# Patient Record
Sex: MALE | Race: OTHER | HISPANIC OR LATINO | URBAN - METROPOLITAN AREA
[De-identification: names, ages, dates, MRNs, and addresses within clinical notes are randomized per-mention and may not be internally consistent; named-entity substitution may affect disease eponyms.]

---

## 2017-11-20 ENCOUNTER — OUTPATIENT (OUTPATIENT)
Dept: OUTPATIENT SERVICES | Facility: HOSPITAL | Age: 22
LOS: 1 days | End: 2017-11-20
Payer: MEDICAID

## 2017-11-20 ENCOUNTER — APPOINTMENT (OUTPATIENT)
Dept: INTERNAL MEDICINE | Facility: CLINIC | Age: 22
End: 2017-11-20
Payer: MEDICAID

## 2017-11-20 VITALS
SYSTOLIC BLOOD PRESSURE: 125 MMHG | HEIGHT: 66 IN | BODY MASS INDEX: 22.18 KG/M2 | HEART RATE: 87 BPM | WEIGHT: 138 LBS | OXYGEN SATURATION: 98 % | DIASTOLIC BLOOD PRESSURE: 79 MMHG

## 2017-11-20 PROCEDURE — G0463: CPT

## 2017-11-20 PROCEDURE — 86695 HERPES SIMPLEX TYPE 1 TEST: CPT

## 2017-11-20 PROCEDURE — 86696 HERPES SIMPLEX TYPE 2 TEST: CPT

## 2017-11-20 PROCEDURE — 86780 TREPONEMA PALLIDUM: CPT

## 2017-11-20 PROCEDURE — 99214 OFFICE O/P EST MOD 30 MIN: CPT | Mod: GC

## 2017-11-20 PROCEDURE — 87389 HIV-1 AG W/HIV-1&-2 AB AG IA: CPT

## 2017-11-21 DIAGNOSIS — I10 ESSENTIAL (PRIMARY) HYPERTENSION: ICD-10-CM

## 2017-11-21 LAB
HIV 1+2 AB+HIV1 P24 AG SERPL QL IA: SIGNIFICANT CHANGE UP
HSV1 IGG SER-ACNC: 0.2 INDEX — SIGNIFICANT CHANGE UP
HSV1 IGG SERPL QL IA: NEGATIVE — SIGNIFICANT CHANGE UP
HSV2 IGG FLD-ACNC: 0.19 INDEX — SIGNIFICANT CHANGE UP
HSV2 IGG SERPL QL IA: NEGATIVE — SIGNIFICANT CHANGE UP
T PALLIDUM AB TITR SER: NEGATIVE — SIGNIFICANT CHANGE UP

## 2017-11-22 LAB
C TRACH RRNA SPEC QL NAA+PROBE: SIGNIFICANT CHANGE UP
N GONORRHOEA RRNA SPEC QL NAA+PROBE: SIGNIFICANT CHANGE UP
SPECIMEN SOURCE: SIGNIFICANT CHANGE UP

## 2017-11-28 PROBLEM — Z00.00 ENCOUNTER FOR PREVENTIVE HEALTH EXAMINATION: Noted: 2017-11-28

## 2017-11-30 DIAGNOSIS — B07.9 VIRAL WART, UNSPECIFIED: ICD-10-CM

## 2017-11-30 DIAGNOSIS — H61.20 IMPACTED CERUMEN, UNSPECIFIED EAR: ICD-10-CM

## 2017-11-30 DIAGNOSIS — S09.92XA UNSPECIFIED INJURY OF NOSE, INITIAL ENCOUNTER: ICD-10-CM

## 2017-12-01 ENCOUNTER — APPOINTMENT (OUTPATIENT)
Dept: CT IMAGING | Facility: IMAGING CENTER | Age: 22
End: 2017-12-01
Payer: MEDICAID

## 2017-12-01 ENCOUNTER — OUTPATIENT (OUTPATIENT)
Dept: OUTPATIENT SERVICES | Facility: HOSPITAL | Age: 22
LOS: 1 days | End: 2017-12-01
Payer: MEDICAID

## 2017-12-01 DIAGNOSIS — S09.92XA UNSPECIFIED INJURY OF NOSE, INITIAL ENCOUNTER: ICD-10-CM

## 2017-12-01 PROCEDURE — 70486 CT MAXILLOFACIAL W/O DYE: CPT

## 2017-12-01 PROCEDURE — 70486 CT MAXILLOFACIAL W/O DYE: CPT | Mod: 26

## 2017-12-28 ENCOUNTER — APPOINTMENT (OUTPATIENT)
Dept: DERMATOLOGY | Facility: CLINIC | Age: 22
End: 2017-12-28

## 2018-04-13 ENCOUNTER — APPOINTMENT (OUTPATIENT)
Dept: PLASTIC SURGERY | Facility: CLINIC | Age: 23
End: 2018-04-13
Payer: MEDICAID

## 2018-04-13 VITALS — BODY MASS INDEX: 17.71 KG/M2 | WEIGHT: 150 LBS | HEIGHT: 77 IN

## 2018-04-13 DIAGNOSIS — R06.89 OTHER ABNORMALITIES OF BREATHING: ICD-10-CM

## 2018-04-13 PROCEDURE — 99204 OFFICE O/P NEW MOD 45 MIN: CPT

## 2018-04-17 PROBLEM — R06.89 DIFFICULTY BREATHING: Status: ACTIVE | Noted: 2018-04-17

## 2018-05-05 ENCOUNTER — OUTPATIENT (OUTPATIENT)
Dept: OUTPATIENT SERVICES | Facility: HOSPITAL | Age: 23
LOS: 1 days | End: 2018-05-05

## 2018-05-05 VITALS
DIASTOLIC BLOOD PRESSURE: 70 MMHG | TEMPERATURE: 98 F | HEIGHT: 66 IN | RESPIRATION RATE: 16 BRPM | WEIGHT: 141.98 LBS | HEART RATE: 70 BPM | SYSTOLIC BLOOD PRESSURE: 120 MMHG

## 2018-05-05 DIAGNOSIS — J34.2 DEVIATED NASAL SEPTUM: ICD-10-CM

## 2018-05-05 LAB
HCT VFR BLD CALC: 48 % — SIGNIFICANT CHANGE UP (ref 39–50)
HGB BLD-MCNC: 15.3 G/DL — SIGNIFICANT CHANGE UP (ref 13–17)
MCHC RBC-ENTMCNC: 28.5 PG — SIGNIFICANT CHANGE UP (ref 27–34)
MCHC RBC-ENTMCNC: 31.9 % — LOW (ref 32–36)
MCV RBC AUTO: 89.6 FL — SIGNIFICANT CHANGE UP (ref 80–100)
NRBC # FLD: 0 — SIGNIFICANT CHANGE UP
PLATELET # BLD AUTO: 242 K/UL — SIGNIFICANT CHANGE UP (ref 150–400)
PMV BLD: 12 FL — SIGNIFICANT CHANGE UP (ref 7–13)
RBC # BLD: 5.36 M/UL — SIGNIFICANT CHANGE UP (ref 4.2–5.8)
RBC # FLD: 12.8 % — SIGNIFICANT CHANGE UP (ref 10.3–14.5)
WBC # BLD: 5.7 K/UL — SIGNIFICANT CHANGE UP (ref 3.8–10.5)
WBC # FLD AUTO: 5.7 K/UL — SIGNIFICANT CHANGE UP (ref 3.8–10.5)

## 2018-05-05 NOTE — H&P PST ADULT - HISTORY OF PRESENT ILLNESS
22yr old male with preop dx of deviated nasal septum, injury of nose presents to have PST eval for Rhinoplasty, septoplasty, reduction of turbinales possible graft scheduled on 5/16/2018. Patient states 6 months ago he sustained an injury to his nose at work and had nasal fracture.

## 2018-05-05 NOTE — H&P PST ADULT - MUSCULOSKELETAL
details… detailed exam ROM intact/no calf tenderness/no joint swelling/no joint erythema/no joint warmth

## 2018-05-05 NOTE — H&P PST ADULT - PROBLEM SELECTOR PLAN 1
Scheduled for Rhinoplasty, septoplasty, Reduction of turbinates possible graft on 5/16/2018.  Preop instructions provided and pt verbalized understanding.  Labs done and results pending.  Famotidine provided with instructions.

## 2018-05-05 NOTE — H&P PST ADULT - NSANTHOSAYNRD_GEN_A_CORE
No. MARISSA screening performed.  STOP BANG Legend: 0-2 = LOW Risk; 3-4 = INTERMEDIATE Risk; 5-8 = HIGH Risk

## 2018-05-05 NOTE — H&P PST ADULT - NEGATIVE ALLERGY TYPES
no outdoor environmental allergies/no reactions to medicines/no reactions to food/no indoor environmental allergies/no reactions to animals/no reactions to insect bites

## 2018-05-15 ENCOUNTER — TRANSCRIPTION ENCOUNTER (OUTPATIENT)
Age: 23
End: 2018-05-15

## 2018-05-16 ENCOUNTER — OUTPATIENT (OUTPATIENT)
Dept: OUTPATIENT SERVICES | Facility: HOSPITAL | Age: 23
LOS: 1 days | Discharge: HOME | End: 2018-05-16
Payer: COMMERCIAL

## 2018-05-16 ENCOUNTER — MOBILE ON CALL (OUTPATIENT)
Age: 23
End: 2018-05-16

## 2018-05-16 VITALS
TEMPERATURE: 98 F | HEART RATE: 89 BPM | OXYGEN SATURATION: 97 % | SYSTOLIC BLOOD PRESSURE: 143 MMHG | DIASTOLIC BLOOD PRESSURE: 89 MMHG

## 2018-05-16 VITALS
TEMPERATURE: 98 F | OXYGEN SATURATION: 100 % | HEIGHT: 66 IN | SYSTOLIC BLOOD PRESSURE: 124 MMHG | RESPIRATION RATE: 16 BRPM | WEIGHT: 141.98 LBS | DIASTOLIC BLOOD PRESSURE: 68 MMHG | HEART RATE: 65 BPM

## 2018-05-16 DIAGNOSIS — J34.2 DEVIATED NASAL SEPTUM: ICD-10-CM

## 2018-05-16 PROCEDURE — 20912 REMOVE CARTILAGE FOR GRAFT: CPT | Mod: 59

## 2018-05-16 PROCEDURE — 30435 REVISION OF NOSE: CPT

## 2018-05-16 PROCEDURE — 30930 THER FX NASAL INF TURBINATE: CPT

## 2018-05-16 PROCEDURE — 30520 REPAIR OF NASAL SEPTUM: CPT

## 2018-05-16 NOTE — ASU DISCHARGE PLAN (ADULT/PEDIATRIC). - ACTIVITY LEVEL
no bending at waist , no lifting; sleep with head of bed elevated/no tub baths/no heavy lifting/no exercise/no sports/gym

## 2018-05-16 NOTE — ASU DISCHARGE PLAN (ADULT/PEDIATRIC). - I HAVE READ AND UNDERSTAND THE ABOVE INSTRUCTIONS AND I UNDERSTAND IT IS IMPORTANT TO FOLLOW THESE INSTRUCTIONS
Statement Selected I will START or STAY ON the medications listed below when I get home from the hospital:    acetaminophen 325 mg oral tablet  -- 2 tab(s) by mouth every 6 hours, As needed, Mild Pain (1 - 3)  -- Indication: For Headache    ibuprofen 400 mg oral tablet  -- 1 tab(s) by mouth every 8 hours, As needed, moderate to severe pain  -- Indication: For Jaw Pain/ Headache    aluminum hydroxide-magnesium hydroxide 200 mg-200 mg/5 mL oral suspension  -- 30 milliliter(s) by mouth every 6 hours, As needed, Dyspepsia  -- Indication: For Burning chest pain    LORazepam 0.5 mg oral tablet  -- 2 tab(s) by mouth 2 times a day MDD:4 tabs  -- Indication: For Anxiety/conversion disorder    QUEtiapine 50 mg oral tablet  -- 1 tab(s) by mouth once a day (at bedtime)  -- Indication: For Anxiety/conversion disorder    pantoprazole 40 mg oral granule, delayed release  -- 40 milligram(s) by mouth once a day   -- Indication: For Gastric reflux

## 2018-05-16 NOTE — ASU DISCHARGE PLAN (ADULT/PEDIATRIC). - SPECIAL INSTRUCTIONS
KEEP NASAL SPLINT INTACT    DO NOT BLOW YOUR NOSE    SEE DR. JIMENEZ ON FRIDAY    SLEEP WITH HEAD OF BED ELEVATED

## 2018-05-23 ENCOUNTER — APPOINTMENT (OUTPATIENT)
Dept: PLASTIC SURGERY | Facility: CLINIC | Age: 23
End: 2018-05-23
Payer: MEDICAID

## 2018-05-23 PROCEDURE — 99024 POSTOP FOLLOW-UP VISIT: CPT

## 2018-06-01 ENCOUNTER — APPOINTMENT (OUTPATIENT)
Dept: PLASTIC SURGERY | Facility: CLINIC | Age: 23
End: 2018-06-01

## 2018-10-17 ENCOUNTER — EMERGENCY (EMERGENCY)
Facility: HOSPITAL | Age: 23
LOS: 1 days | Discharge: HOME | End: 2018-10-17
Attending: EMERGENCY MEDICINE
Payer: COMMERCIAL

## 2018-10-17 VITALS
OXYGEN SATURATION: 98 % | SYSTOLIC BLOOD PRESSURE: 131 MMHG | HEART RATE: 82 BPM | DIASTOLIC BLOOD PRESSURE: 74 MMHG | RESPIRATION RATE: 16 BRPM | HEIGHT: 66 IN | WEIGHT: 149.91 LBS | TEMPERATURE: 98 F

## 2018-10-17 PROCEDURE — 99283 EMERGENCY DEPT VISIT LOW MDM: CPT

## 2018-10-17 PROCEDURE — 73610 X-RAY EXAM OF ANKLE: CPT | Mod: 26,LT

## 2018-10-17 PROCEDURE — 73610 X-RAY EXAM OF ANKLE: CPT

## 2018-10-17 NOTE — ED ADULT NURSE NOTE - NSIMPLEMENTINTERV_GEN_ALL_ED
Implemented All Universal Safety Interventions:  [place] to call system. Call bell, personal items and telephone within reach. Instruct patient to call for assistance. Room bathroom lighting operational. Non-slip footwear when patient is off stretcher. Physically safe environment: no spills, clutter or unnecessary equipment. Stretcher in lowest position, wheels locked, appropriate side rails in place.

## 2018-10-17 NOTE — ED PROVIDER NOTE - PHYSICAL EXAMINATION
GEN: NAD  L ankle: mild edema, no deformity, neurovascularly intact, trace bruising on the lateral aspect of the foot

## 2018-10-17 NOTE — ED PROVIDER NOTE - OBJECTIVE STATEMENT
22 yo male landed on L ankle awkwardly after going up for a rebound.  Denies other injury.  Able to bear weight with some discomfort.  Constant moderate pain.

## 2018-10-17 NOTE — ED ADULT NURSE NOTE - OBJECTIVE STATEMENT
pt was playing basketball on monday and injured his left ankle.  area is swollen and bruised.  he has been weight bearing with pain.  pulses and refill are wdl

## 2018-10-18 NOTE — DISCUSSION/SUMMARY
[Home] : patient was discharged to home [FreeTextEntry1] : Xray ankle showed no fractures. Pt had ACE wrap placed and crutches, advised to follow up with sports medicine.

## 2018-10-18 NOTE — PLAN
[FreeTextEntry3] : Called patient, left voicemail to make a followup appointment with our clinic. Clinic number provided.

## 2019-03-04 ENCOUNTER — OUTPATIENT (OUTPATIENT)
Dept: OUTPATIENT SERVICES | Facility: HOSPITAL | Age: 24
LOS: 1 days | End: 2019-03-04
Payer: COMMERCIAL

## 2019-03-04 ENCOUNTER — LABORATORY RESULT (OUTPATIENT)
Age: 24
End: 2019-03-04

## 2019-03-04 ENCOUNTER — APPOINTMENT (OUTPATIENT)
Dept: INTERNAL MEDICINE | Facility: CLINIC | Age: 24
End: 2019-03-04
Payer: MEDICAID

## 2019-03-04 VITALS
OXYGEN SATURATION: 98 % | DIASTOLIC BLOOD PRESSURE: 70 MMHG | HEART RATE: 71 BPM | WEIGHT: 148 LBS | BODY MASS INDEX: 23.78 KG/M2 | SYSTOLIC BLOOD PRESSURE: 110 MMHG | HEIGHT: 66 IN

## 2019-03-04 DIAGNOSIS — I10 ESSENTIAL (PRIMARY) HYPERTENSION: ICD-10-CM

## 2019-03-04 PROCEDURE — G0463: CPT

## 2019-03-04 PROCEDURE — 99213 OFFICE O/P EST LOW 20 MIN: CPT | Mod: GE

## 2019-03-04 NOTE — ASSESSMENT
[FreeTextEntry1] : 22 yo M w/ PMHx of deviated septum s/p nasal septoplasty presenting for annual physical and cutaneous warts\par \par # Cutaneous warts\par - gave referral for dermatology\par \par # Health maintenance\par - Routine labs not indicated as pt has no risk factors or significant family history w/ normal BMI\par - Sent STI screening - will call pt w/ results \par - declined flu shot \par - Tdap up to date\par - Depression screen negative \par \par Heidi Mak MD\par Internal Medicine, PGY-1

## 2019-03-04 NOTE — HISTORY OF PRESENT ILLNESS
[FreeTextEntry1] : annual physical  [de-identified] : 24 yo M w/ a PMHx significant for a deviated septum who is presenting for an annual physical exam and evaluation of warts.  Pt has tried over the counter medications with no relief.  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  Current social smoker, drinks once a month, no illicit drugs.  Sexually active, uses protection lives w/ mother\par uses seatbelt when drives\par exercise via construction and also plays basketball on the weekend, encourage healthy diet w/ fruits and vegetables w/ moderation in sweets and fats \par Depression screen negative \par

## 2019-03-04 NOTE — REVIEW OF SYSTEMS
[Headache] : headache [Fatigue] : no fatigue [Night Sweats] : no night sweats [Recent Change In Weight] : ~T no recent weight change [Vision Problems] : no vision problems [Hearing Loss] : no hearing loss [Nosebleeds] : no nosebleeds [Nasal Discharge] : no nasal discharge [Hoarseness] : no hoarseness [Chest Pain] : no chest pain [Palpitations] : no palpitations [Shortness Of Breath] : no shortness of breath [Wheezing] : no wheezing [Abdominal Pain] : no abdominal pain [Nausea] : no nausea [Vomiting] : no vomiting [Heartburn] : no heartburn [Dysuria] : no dysuria [Hematuria] : no hematuria [Suicidal] : not suicidal [Insomnia] : no insomnia [Anxiety] : no anxiety [Depression] : no depression [Easy Bleeding] : no easy bleeding [Easy Bruising] : no easy bruising [Swollen Glands] : no swollen glands [de-identified] : tension headaches

## 2019-03-04 NOTE — PHYSICAL EXAM
[No Acute Distress] : no acute distress [Well Developed] : well developed [Well-Appearing] : well-appearing [Normal Sclera/Conjunctiva] : normal sclera/conjunctiva [EOMI] : extraocular movements intact [No Lymphadenopathy] : no lymphadenopathy [No Respiratory Distress] : no respiratory distress  [Clear to Auscultation] : lungs were clear to auscultation bilaterally [No Accessory Muscle Use] : no accessory muscle use [Normal Rate] : normal rate  [Regular Rhythm] : with a regular rhythm [Normal S1, S2] : normal S1 and S2 [No Edema] : there was no peripheral edema [Soft] : abdomen soft [Non Tender] : non-tender [Non-distended] : non-distended [No Masses] : no abdominal mass palpated [Normal Bowel Sounds] : normal bowel sounds [Normal Supraclavicular Nodes] : no supraclavicular lymphadenopathy [Normal Posterior Cervical Nodes] : no posterior cervical lymphadenopathy [Normal Anterior Cervical Nodes] : no anterior cervical lymphadenopathy [Grossly Normal Strength/Tone] : grossly normal strength/tone [Normal Affect] : the affect was normal [Normal Insight/Judgement] : insight and judgment were intact

## 2019-03-06 LAB — HIV1+2 AB SPEC QL IA.RAPID: NONREACTIVE

## 2019-03-07 DIAGNOSIS — B07.9 VIRAL WART, UNSPECIFIED: ICD-10-CM

## 2019-03-07 LAB — T PALLIDUM AB SER QL IA: NEGATIVE

## 2019-03-22 ENCOUNTER — APPOINTMENT (OUTPATIENT)
Dept: DERMATOLOGY | Facility: CLINIC | Age: 24
End: 2019-03-22

## 2020-09-22 ENCOUNTER — OUTPATIENT (OUTPATIENT)
Dept: OUTPATIENT SERVICES | Facility: HOSPITAL | Age: 25
LOS: 1 days | End: 2020-09-22
Payer: MEDICAID

## 2020-09-22 ENCOUNTER — APPOINTMENT (OUTPATIENT)
Dept: INTERNAL MEDICINE | Facility: CLINIC | Age: 25
End: 2020-09-22
Payer: MEDICAID

## 2020-09-22 VITALS
DIASTOLIC BLOOD PRESSURE: 72 MMHG | HEIGHT: 66 IN | SYSTOLIC BLOOD PRESSURE: 110 MMHG | WEIGHT: 134 LBS | BODY MASS INDEX: 21.53 KG/M2

## 2020-09-22 DIAGNOSIS — J34.2 DEVIATED NASAL SEPTUM: ICD-10-CM

## 2020-09-22 DIAGNOSIS — S09.92XA UNSPECIFIED INJURY OF NOSE, INITIAL ENCOUNTER: ICD-10-CM

## 2020-09-22 DIAGNOSIS — Z00.00 ENCOUNTER FOR GENERAL ADULT MEDICAL EXAMINATION W/OUT ABNORMAL FINDINGS: ICD-10-CM

## 2020-09-22 DIAGNOSIS — B07.9 VIRAL WART, UNSPECIFIED: ICD-10-CM

## 2020-09-22 DIAGNOSIS — S09.92XD UNSPECIFIED INJURY OF NOSE, SUBSEQUENT ENCOUNTER: ICD-10-CM

## 2020-09-22 DIAGNOSIS — I10 ESSENTIAL (PRIMARY) HYPERTENSION: ICD-10-CM

## 2020-09-22 DIAGNOSIS — Z86.69 PERSONAL HISTORY OF OTHER DISEASES OF THE NERVOUS SYSTEM AND SENSE ORGANS: ICD-10-CM

## 2020-09-22 DIAGNOSIS — Z00.00 ENCOUNTER FOR GENERAL ADULT MEDICAL EXAMINATION WITHOUT ABNORMAL FINDINGS: ICD-10-CM

## 2020-09-22 PROCEDURE — G0463: CPT

## 2020-09-22 PROCEDURE — 99395 PREV VISIT EST AGE 18-39: CPT | Mod: GC

## 2020-09-22 RX ORDER — BENZOYL PEROXIDE 5 G/100G
5 LIQUID TOPICAL DAILY
Qty: 1 | Refills: 3 | Status: ACTIVE | COMMUNITY
Start: 2020-09-22 | End: 1900-01-01

## 2020-09-22 RX ORDER — SALICYLIC ACID 275 MG/ML
27.5 LIQUID TOPICAL
Qty: 1 | Refills: 3 | Status: ACTIVE | COMMUNITY
Start: 2020-09-22 | End: 1900-01-01

## 2020-09-24 LAB
ALBUMIN SERPL ELPH-MCNC: 5.3 G/DL
ALP BLD-CCNC: 62 U/L
ALT SERPL-CCNC: 29 U/L
ANION GAP SERPL CALC-SCNC: 12 MMOL/L
AST SERPL-CCNC: 20 U/L
BILIRUB SERPL-MCNC: 0.3 MG/DL
BUN SERPL-MCNC: 13 MG/DL
CALCIUM SERPL-MCNC: 9.9 MG/DL
CHLORIDE SERPL-SCNC: 102 MMOL/L
CHOLEST SERPL-MCNC: 159 MG/DL
CHOLEST/HDLC SERPL: 2.9 RATIO
CO2 SERPL-SCNC: 26 MMOL/L
CREAT SERPL-MCNC: 0.75 MG/DL
GLUCOSE SERPL-MCNC: 95 MG/DL
HDLC SERPL-MCNC: 54 MG/DL
HIV1+2 AB SPEC QL IA.RAPID: NONREACTIVE
LDLC SERPL CALC-MCNC: 84 MG/DL
POTASSIUM SERPL-SCNC: 4.5 MMOL/L
PROT SERPL-MCNC: 7.7 G/DL
SODIUM SERPL-SCNC: 140 MMOL/L
T PALLIDUM AB SER QL IA: NEGATIVE
TRIGL SERPL-MCNC: 107 MG/DL
TSH SERPL-ACNC: 1.3 UIU/ML

## 2020-09-25 LAB
C TRACH IGA SER-ACNC: NORMAL
C TRACH IGG TITR SER: NORMAL
C TRACH IGM TITR SER: NORMAL
CHLAMYDIA TRACHOMATIS AB INTERPRETATION: NORMAL

## 2020-10-13 NOTE — PHYSICAL EXAM
[Normal] : normal gait, coordination grossly intact, no focal deficits [de-identified] : + cutaneous wart on left medial thumb and left plantar aspect of foot. Lesions c/d/i.

## 2020-10-13 NOTE — HISTORY OF PRESENT ILLNESS
[de-identified] : Mr. Rivera is a 24 y/o male with a significant PMH of a deviated septum s/p surgical correction presenting to the office today for his CPE. Patient reports no acute interval events and states that he is in good spirits and health during the COVID pandemic. He reports having a wart on the medial aspect of his thumb which is intact, non-purulent, non-erythematous, and otherwise relatively stable. He also reports having a wart-like lesion on the sole of his left foot which he states is a nuisance to him when he walks or works. He states that he tries to remove the dried skin on top of the wart but would like to manage it differently. He otherwise denies any headaches, n/v/d, chest pain, sob, abdominal pain, dysuria, hematuria, urinary changes, changes with his bowel habits. \par \par HEADSS:\par Home: Patient lives at home with his mother and her boyfriend. States that they take proper precautions at home due to the pandemic and nobody at home is sick. \par Education: patient high school graduate. Plans to go to trade school in the future. \par Activity: For leisure yulitent likes to shop and play basketball. He is active with his friends and likes to hang out with them in his leisure time. \par Drugs: Denies any marijuana or tobacco use. Patient drinks alcohol socially and denies any binge drinking and blackouts. \par Sexual Hx: Patient is sexually active with his partner. States that he consistently uses protection. Denies any discharge, pruritus, or genital changes. Patient is heterosexual and only endorses having vaginal sex with his partner. He has had many partners in the past and states that there are times that he has not used protection previously. \par Safety: Patient does not text and drive, states that he also does not drink and drive. Wears a seatbelt. Takes proper sun precautions while at work.

## 2020-10-13 NOTE — ASSESSMENT
[FreeTextEntry1] : 26 y/o male with PMHx of cutaneous warts and deviated nasal septum s/p surgical repair here for his complete annual physical examination. \par \par # Cutaneous warts:\par -Two lesions identified on this visit. Patient states that he previously had lesions on his right hand that self resolved. 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 The patient's warts on exam are stable. He was advised not to manipulate the lesion on the plantar aspect of his foot as the lesion may scar and lead to permanent discomfort. The patient was advised of OTC wart removal Rx that he stated that he would use. Will prescribe salicylic acid for the patient to monitor his response. Patient also provided a derm removal should those treatments fail and possible indication for cryotherapy or further interventions arise. \par \par #Deviated nasal septum:\par -Patient still has trouble breathing with his right nostril. No snoring, shortness of breath, or trouble with his daily activity but states that he does not feel like the septum is healing properly. Will provide the patient with referral to ENT to follow up. \par \par #HCM:\par -Flu shot offered and deferred by the patient\par -F/u G/C, syphilis, HIV screening test. patient advised on safe sex practices. \par -F/u thyroid panel, lipid profile, and CMP. \par -Counseled on the need for sunscreen, safe practices at work, the importance of good sleep,diet, and exercise. \par -RTC in 1 year or PRN should anything acute arise. \par \par Plan d/w Dr. Chandra.

## 2020-10-28 LAB — GONORRHOEAE AB: NORMAL

## 2020-11-10 ENCOUNTER — APPOINTMENT (OUTPATIENT)
Dept: DERMATOLOGY | Facility: CLINIC | Age: 25
End: 2020-11-10
Payer: MEDICAID

## 2020-11-10 VITALS — WEIGHT: 145 LBS | BODY MASS INDEX: 23.3 KG/M2 | HEIGHT: 66 IN

## 2020-11-10 DIAGNOSIS — Z87.2 PERSONAL HISTORY OF DISEASES OF THE SKIN AND SUBCUTANEOUS TISSUE: ICD-10-CM

## 2020-11-10 DIAGNOSIS — Z77.22 CONTACT WITH AND (SUSPECTED) EXPOSURE TO ENVIRONMENTAL TOBACCO SMOKE (ACUTE) (CHRONIC): ICD-10-CM

## 2020-11-10 DIAGNOSIS — Z84.0 FAMILY HISTORY OF DISEASES OF THE SKIN AND SUBCUTANEOUS TISSUE: ICD-10-CM

## 2020-11-10 PROCEDURE — 99202 OFFICE O/P NEW SF 15 MIN: CPT | Mod: GC

## 2020-11-10 PROCEDURE — 99072 ADDL SUPL MATRL&STAF TM PHE: CPT

## 2020-11-11 ENCOUNTER — OUTPATIENT (OUTPATIENT)
Dept: OUTPATIENT SERVICES | Facility: HOSPITAL | Age: 25
LOS: 1 days | End: 2020-11-11
Payer: MEDICAID

## 2020-11-11 ENCOUNTER — APPOINTMENT (OUTPATIENT)
Dept: INTERNAL MEDICINE | Facility: CLINIC | Age: 25
End: 2020-11-11
Payer: MEDICAID

## 2020-11-11 VITALS
HEIGHT: 66 IN | DIASTOLIC BLOOD PRESSURE: 70 MMHG | WEIGHT: 148 LBS | BODY MASS INDEX: 23.78 KG/M2 | SYSTOLIC BLOOD PRESSURE: 112 MMHG

## 2020-11-11 DIAGNOSIS — I10 ESSENTIAL (PRIMARY) HYPERTENSION: ICD-10-CM

## 2020-11-11 DIAGNOSIS — S62.91XA UNSPECIFIED FRACTURE OF RIGHT WRIST AND HAND, INITIAL ENCOUNTER FOR CLOSED FRACTURE: ICD-10-CM

## 2020-11-11 PROCEDURE — 99212 OFFICE O/P EST SF 10 MIN: CPT | Mod: GE

## 2020-11-11 PROCEDURE — G0463: CPT

## 2020-11-12 ENCOUNTER — APPOINTMENT (OUTPATIENT)
Dept: ORTHOPEDIC SURGERY | Facility: CLINIC | Age: 25
End: 2020-11-12
Payer: MEDICAID

## 2020-11-12 VITALS
BODY MASS INDEX: 23.46 KG/M2 | HEIGHT: 66 IN | HEART RATE: 97 BPM | DIASTOLIC BLOOD PRESSURE: 75 MMHG | WEIGHT: 146 LBS | OXYGEN SATURATION: 98 % | SYSTOLIC BLOOD PRESSURE: 124 MMHG

## 2020-11-12 DIAGNOSIS — S62.336D DISPLACED FRACTURE OF NECK OF FIFTH METACARPAL BONE, RIGHT HAND, SUBSEQUENT ENCOUNTER FOR FRACTURE WITH ROUTINE HEALING: ICD-10-CM

## 2020-11-12 PROCEDURE — 29075 APPL CST ELBW FNGR SHORT ARM: CPT | Mod: RT

## 2020-11-12 PROCEDURE — 99072 ADDL SUPL MATRL&STAF TM PHE: CPT

## 2020-11-12 PROCEDURE — 99204 OFFICE O/P NEW MOD 45 MIN: CPT | Mod: 25

## 2020-11-20 ENCOUNTER — APPOINTMENT (OUTPATIENT)
Dept: DERMATOLOGY | Facility: CLINIC | Age: 25
End: 2020-11-20

## 2020-12-14 ENCOUNTER — APPOINTMENT (OUTPATIENT)
Dept: ORTHOPEDIC SURGERY | Facility: CLINIC | Age: 25
End: 2020-12-14

## 2020-12-15 ENCOUNTER — APPOINTMENT (OUTPATIENT)
Dept: DERMATOLOGY | Facility: CLINIC | Age: 25
End: 2020-12-15
Payer: MEDICAID

## 2020-12-15 DIAGNOSIS — L70.0 ACNE VULGARIS: ICD-10-CM

## 2020-12-15 DIAGNOSIS — B07.8 OTHER VIRAL WARTS: ICD-10-CM

## 2020-12-15 DIAGNOSIS — L73.0 ACNE KELOID: ICD-10-CM

## 2020-12-15 DIAGNOSIS — Z79.899 OTHER LONG TERM (CURRENT) DRUG THERAPY: ICD-10-CM

## 2020-12-15 PROCEDURE — 99213 OFFICE O/P EST LOW 20 MIN: CPT | Mod: 25,GC

## 2020-12-15 PROCEDURE — 99072 ADDL SUPL MATRL&STAF TM PHE: CPT

## 2020-12-15 PROCEDURE — 17110 DESTRUCTION B9 LES UP TO 14: CPT

## 2020-12-18 PROBLEM — Z79.899 HIGH RISK MEDICATION USE: Status: ACTIVE | Noted: 2020-12-18

## 2020-12-18 PROBLEM — B07.8 VERRUCA VULGARIS: Status: ACTIVE | Noted: 2020-11-10

## 2020-12-29 RX ORDER — ISOTRETINOIN 20 MG/1
20 CAPSULE ORAL DAILY
Qty: 1 | Refills: 0 | Status: ACTIVE | COMMUNITY
Start: 2020-12-15

## 2021-01-19 ENCOUNTER — APPOINTMENT (OUTPATIENT)
Dept: DERMATOLOGY | Facility: CLINIC | Age: 26
End: 2021-01-19

## 2021-02-16 PROBLEM — S62.91XA HAND FRACTURE, RIGHT: Status: ACTIVE | Noted: 2020-11-11

## 2021-02-16 NOTE — END OF VISIT
[] : Resident [FreeTextEntry3] : refer to hand ortho asap and arrang  appt [Time Spent: ___ minutes] : I have spent [unfilled] minutes of time on the encounter.

## 2021-02-16 NOTE — ASSESSMENT
[FreeTextEntry1] : 25 year old male with hx of cutaneous warts of hands and feet and deviated nasal septum presenting with fracture of R hand after falling. Fracture in 5th metacarpal with hand in splint done at urgent care center. \par \par # 5th metacarpal fracture in Right hand\par - C/w with splint until ortho hand evaluate. Advised patient to keep the hand in the splint and not remove it until the hand specialist has evaluate him. Informed him that the hand has to be kept straight to promote good bone healing. \par - Ortho hand referral provided for patient.\par -  called to try and make appointment for patient. \par - Tylenol prn for pain\par \par Case discussed with Dr. Carlson\par \par Krishna Wong, PGY-1

## 2021-02-16 NOTE — REVIEW OF SYSTEMS
[Negative] : Heme/Lymph [FreeTextEntry9] : R hand pain over 5th metacarpal [de-identified] : swelling of R hand

## 2021-02-16 NOTE — PHYSICAL EXAM
[Normal] : soft, non-tender, non-distended, no masses palpated, no HSM and normal bowel sounds [de-identified] : R hand swelling over 5th metacarpal, 5th finger flexion and sensation intact

## 2021-02-16 NOTE — HISTORY OF PRESENT ILLNESS
[FreeTextEntry8] : 25 year old male with hx of cutaneous warts of hands and feet s/p cryotherapy and deviated nasal septum presenting with fracture of 5th metacarpal of R hand after falling on his hand about 1 week ago. Patient is presenting with his hand wrapped in splint done at the urgent care center he visited on 11/9/20. Patient endorses pain of the hand but has not taken any medications or tried anything for the pain. Patient is requesting referral to hand specialist.

## 2023-07-02 ENCOUNTER — INPATIENT (INPATIENT)
Facility: HOSPITAL | Age: 28
LOS: 3 days | Discharge: HOME | End: 2023-07-06
Attending: STUDENT IN AN ORGANIZED HEALTH CARE EDUCATION/TRAINING PROGRAM | Admitting: STUDENT IN AN ORGANIZED HEALTH CARE EDUCATION/TRAINING PROGRAM
Payer: COMMERCIAL

## 2023-07-02 VITALS
DIASTOLIC BLOOD PRESSURE: 79 MMHG | SYSTOLIC BLOOD PRESSURE: 126 MMHG | TEMPERATURE: 98 F | HEART RATE: 92 BPM | OXYGEN SATURATION: 100 % | RESPIRATION RATE: 16 BRPM

## 2023-07-02 PROCEDURE — 99285 EMERGENCY DEPT VISIT HI MDM: CPT

## 2023-07-02 RX ORDER — LIDOCAINE 4 G/100G
1 CREAM TOPICAL ONCE
Refills: 0 | Status: COMPLETED | OUTPATIENT
Start: 2023-07-02 | End: 2023-07-02

## 2023-07-02 RX ORDER — OXYCODONE AND ACETAMINOPHEN 5; 325 MG/1; MG/1
1 TABLET ORAL ONCE
Refills: 0 | Status: DISCONTINUED | OUTPATIENT
Start: 2023-07-02 | End: 2023-07-02

## 2023-07-02 RX ADMIN — OXYCODONE AND ACETAMINOPHEN 1 TABLET(S): 5; 325 TABLET ORAL at 23:40

## 2023-07-02 RX ADMIN — LIDOCAINE 1 PATCH: 4 CREAM TOPICAL at 23:40

## 2023-07-02 NOTE — ED ADULT NURSE NOTE - NSFALLRISKINTERV_ED_ALL_ED
Assistance OOB with selected safe patient handling equipment if applicable/Assistance with ambulation/Communicate fall risk and risk factors to all staff, patient, and family/Monitor gait and stability/Provide visual cue: yellow wristband, yellow gown, etc/Reinforce activity limits and safety measures with patient and family/Call bell, personal items and telephone in reach/Instruct patient to call for assistance before getting out of bed/chair/stretcher/Non-slip footwear applied when patient is off stretcher/[place] to call system/Physically safe environment - no spills, clutter or unnecessary equipment/Purposeful Proactive Rounding/Room/bathroom lighting operational, light cord in reach
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
## 2023-07-03 LAB
ALBUMIN SERPL ELPH-MCNC: 4.6 G/DL — SIGNIFICANT CHANGE UP (ref 3.3–5)
ALP SERPL-CCNC: 74 U/L — SIGNIFICANT CHANGE UP (ref 40–120)
ALT FLD-CCNC: 18 U/L — SIGNIFICANT CHANGE UP (ref 4–41)
ANION GAP SERPL CALC-SCNC: 14 MMOL/L — SIGNIFICANT CHANGE UP (ref 7–14)
AST SERPL-CCNC: 17 U/L — SIGNIFICANT CHANGE UP (ref 4–40)
BASOPHILS # BLD AUTO: 0.03 K/UL — SIGNIFICANT CHANGE UP (ref 0–0.2)
BASOPHILS NFR BLD AUTO: 0.3 % — SIGNIFICANT CHANGE UP (ref 0–2)
BILIRUB SERPL-MCNC: 0.6 MG/DL — SIGNIFICANT CHANGE UP (ref 0.2–1.2)
BUN SERPL-MCNC: 15 MG/DL — SIGNIFICANT CHANGE UP (ref 7–23)
CALCIUM SERPL-MCNC: 9.2 MG/DL — SIGNIFICANT CHANGE UP (ref 8.4–10.5)
CHLORIDE SERPL-SCNC: 99 MMOL/L — SIGNIFICANT CHANGE UP (ref 98–107)
CO2 SERPL-SCNC: 26 MMOL/L — SIGNIFICANT CHANGE UP (ref 22–31)
CREAT SERPL-MCNC: 0.88 MG/DL — SIGNIFICANT CHANGE UP (ref 0.5–1.3)
EGFR: 122 ML/MIN/1.73M2 — SIGNIFICANT CHANGE UP
EOSINOPHIL # BLD AUTO: 0.01 K/UL — SIGNIFICANT CHANGE UP (ref 0–0.5)
EOSINOPHIL NFR BLD AUTO: 0.1 % — SIGNIFICANT CHANGE UP (ref 0–6)
GLUCOSE SERPL-MCNC: 106 MG/DL — HIGH (ref 70–99)
HCT VFR BLD CALC: 46.5 % — SIGNIFICANT CHANGE UP (ref 39–50)
HGB BLD-MCNC: 15 G/DL — SIGNIFICANT CHANGE UP (ref 13–17)
IANC: 8.77 K/UL — HIGH (ref 1.8–7.4)
IMM GRANULOCYTES NFR BLD AUTO: 0.3 % — SIGNIFICANT CHANGE UP (ref 0–0.9)
LYMPHOCYTES # BLD AUTO: 1.02 K/UL — SIGNIFICANT CHANGE UP (ref 1–3.3)
LYMPHOCYTES # BLD AUTO: 9.5 % — LOW (ref 13–44)
MCHC RBC-ENTMCNC: 28.2 PG — SIGNIFICANT CHANGE UP (ref 27–34)
MCHC RBC-ENTMCNC: 32.3 GM/DL — SIGNIFICANT CHANGE UP (ref 32–36)
MCV RBC AUTO: 87.4 FL — SIGNIFICANT CHANGE UP (ref 80–100)
MONOCYTES # BLD AUTO: 0.92 K/UL — HIGH (ref 0–0.9)
MONOCYTES NFR BLD AUTO: 8.5 % — SIGNIFICANT CHANGE UP (ref 2–14)
NEUTROPHILS # BLD AUTO: 8.77 K/UL — HIGH (ref 1.8–7.4)
NEUTROPHILS NFR BLD AUTO: 81.3 % — HIGH (ref 43–77)
NRBC # BLD: 0 /100 WBCS — SIGNIFICANT CHANGE UP (ref 0–0)
NRBC # FLD: 0 K/UL — SIGNIFICANT CHANGE UP (ref 0–0)
PLATELET # BLD AUTO: 224 K/UL — SIGNIFICANT CHANGE UP (ref 150–400)
POTASSIUM SERPL-MCNC: 4 MMOL/L — SIGNIFICANT CHANGE UP (ref 3.5–5.3)
POTASSIUM SERPL-SCNC: 4 MMOL/L — SIGNIFICANT CHANGE UP (ref 3.5–5.3)
PROT SERPL-MCNC: 7.5 G/DL — SIGNIFICANT CHANGE UP (ref 6–8.3)
RBC # BLD: 5.32 M/UL — SIGNIFICANT CHANGE UP (ref 4.2–5.8)
RBC # FLD: 13.2 % — SIGNIFICANT CHANGE UP (ref 10.3–14.5)
SODIUM SERPL-SCNC: 139 MMOL/L — SIGNIFICANT CHANGE UP (ref 135–145)
WBC # BLD: 10.78 K/UL — HIGH (ref 3.8–10.5)
WBC # FLD AUTO: 10.78 K/UL — HIGH (ref 3.8–10.5)

## 2023-07-03 PROCEDURE — 70450 CT HEAD/BRAIN W/O DYE: CPT | Mod: 26,MG

## 2023-07-03 PROCEDURE — 99223 1ST HOSP IP/OBS HIGH 75: CPT

## 2023-07-03 PROCEDURE — 74177 CT ABD & PELVIS W/CONTRAST: CPT | Mod: 26,MG

## 2023-07-03 PROCEDURE — 72128 CT CHEST SPINE W/O DYE: CPT | Mod: 26,MG

## 2023-07-03 PROCEDURE — 71260 CT THORAX DX C+: CPT | Mod: 26,MG

## 2023-07-03 PROCEDURE — G1004: CPT

## 2023-07-03 PROCEDURE — 72125 CT NECK SPINE W/O DYE: CPT | Mod: 26,MG

## 2023-07-03 PROCEDURE — 72131 CT LUMBAR SPINE W/O DYE: CPT | Mod: 26,MG

## 2023-07-03 RX ORDER — KETOROLAC TROMETHAMINE 30 MG/ML
15 SYRINGE (ML) INJECTION EVERY 6 HOURS
Refills: 0 | Status: DISCONTINUED | OUTPATIENT
Start: 2023-07-03 | End: 2023-07-05

## 2023-07-03 RX ORDER — DIAZEPAM 5 MG
5 TABLET ORAL ONCE
Refills: 0 | Status: DISCONTINUED | OUTPATIENT
Start: 2023-07-03 | End: 2023-07-03

## 2023-07-03 RX ORDER — LIDOCAINE 4 G/100G
1 CREAM TOPICAL DAILY
Refills: 0 | Status: DISCONTINUED | OUTPATIENT
Start: 2023-07-04 | End: 2023-07-06

## 2023-07-03 RX ORDER — MORPHINE SULFATE 50 MG/1
4 CAPSULE, EXTENDED RELEASE ORAL ONCE
Refills: 0 | Status: DISCONTINUED | OUTPATIENT
Start: 2023-07-03 | End: 2023-07-03

## 2023-07-03 RX ORDER — KETOROLAC TROMETHAMINE 30 MG/ML
15 SYRINGE (ML) INJECTION ONCE
Refills: 0 | Status: DISCONTINUED | OUTPATIENT
Start: 2023-07-03 | End: 2023-07-03

## 2023-07-03 RX ORDER — ACETAMINOPHEN 500 MG
650 TABLET ORAL ONCE
Refills: 0 | Status: DISCONTINUED | OUTPATIENT
Start: 2023-07-03 | End: 2023-07-05

## 2023-07-03 RX ORDER — OXYCODONE HYDROCHLORIDE 5 MG/1
5 TABLET ORAL EVERY 6 HOURS
Refills: 0 | Status: DISCONTINUED | OUTPATIENT
Start: 2023-07-03 | End: 2023-07-05

## 2023-07-03 RX ORDER — KETOROLAC TROMETHAMINE 30 MG/ML
30 SYRINGE (ML) INJECTION ONCE
Refills: 0 | Status: DISCONTINUED | OUTPATIENT
Start: 2023-07-03 | End: 2023-07-03

## 2023-07-03 RX ORDER — LIDOCAINE 4 G/100G
1 CREAM TOPICAL ONCE
Refills: 0 | Status: COMPLETED | OUTPATIENT
Start: 2023-07-03 | End: 2023-07-03

## 2023-07-03 RX ADMIN — Medication 30 MILLIGRAM(S): at 05:08

## 2023-07-03 RX ADMIN — Medication 15 MILLIGRAM(S): at 14:29

## 2023-07-03 RX ADMIN — MORPHINE SULFATE 4 MILLIGRAM(S): 50 CAPSULE, EXTENDED RELEASE ORAL at 01:01

## 2023-07-03 RX ADMIN — MORPHINE SULFATE 4 MILLIGRAM(S): 50 CAPSULE, EXTENDED RELEASE ORAL at 00:31

## 2023-07-03 RX ADMIN — Medication 15 MILLIGRAM(S): at 17:09

## 2023-07-03 RX ADMIN — OXYCODONE HYDROCHLORIDE 5 MILLIGRAM(S): 5 TABLET ORAL at 22:28

## 2023-07-03 RX ADMIN — Medication 5 MILLIGRAM(S): at 10:59

## 2023-07-03 RX ADMIN — MORPHINE SULFATE 4 MILLIGRAM(S): 50 CAPSULE, EXTENDED RELEASE ORAL at 05:28

## 2023-07-03 RX ADMIN — LIDOCAINE 1 PATCH: 4 CREAM TOPICAL at 22:00

## 2023-07-03 RX ADMIN — MORPHINE SULFATE 4 MILLIGRAM(S): 50 CAPSULE, EXTENDED RELEASE ORAL at 05:56

## 2023-07-03 RX ADMIN — LIDOCAINE 1 PATCH: 4 CREAM TOPICAL at 11:09

## 2023-07-03 RX ADMIN — Medication 30 MILLIGRAM(S): at 04:37

## 2023-07-03 RX ADMIN — Medication 15 MILLIGRAM(S): at 22:28

## 2023-07-03 RX ADMIN — Medication 15 MILLIGRAM(S): at 16:54

## 2023-07-03 RX ADMIN — Medication 15 MILLIGRAM(S): at 10:59

## 2023-07-03 RX ADMIN — OXYCODONE AND ACETAMINOPHEN 1 TABLET(S): 5; 325 TABLET ORAL at 00:10
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
## 2023-07-03 NOTE — ED PROVIDER NOTE - PROGRESS NOTE DETAILS
PA ALi: patient having persistent lower back pain after several rounds of medications, will place in CDU for TLSO francine,Please contact Sammy MartinHndskep-[phone].

## 2023-07-03 NOTE — ED ADULT NURSE REASSESSMENT NOTE - NS ED NURSE REASSESS COMMENT FT1
Break covering RN: Pt received from intake RN. pt a&ox4 and amb. Pt c/o lower back. Pt strength equal b/l. VS as noted, will continue to monitor.

## 2023-07-03 NOTE — ED CDU PROVIDER INITIAL DAY NOTE - NS ED ATTENDING STATEMENT MOD
This was a shared visit with the BINTA. I reviewed and verified the documentation and independently performed the documented:

## 2023-07-03 NOTE — ED PROVIDER NOTE - PHYSICAL EXAMINATION
Uncomfortable, rrr, ctabl, abdomen soft, no evidence of head trauma, no open wounds  Exquisite lumbar spinal ttp, no pelvic instability  No c/t lumbar spinal ttp or stepoffs  SILT in all four extremities
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
## 2023-07-03 NOTE — ED CDU PROVIDER INITIAL DAY NOTE - NSICDXPASTSURGICALHX_GEN_ALL_CORE_FT
Requesting medication refill. Please approve or deny this request.    Rx requested:  Requested Prescriptions     Pending Prescriptions Disp Refills    carvedilol (COREG) 6.25 MG tablet [Pharmacy Med Name: CARVEDILOL 6.25MG TABLETS] 180 tablet 3     Sig: TAKE 1 TABLET BY MOUTH TWICE DAILY WITH MEALS         Last Office Visit:   Visit date not found      Next Visit Date:  Future Appointments   Date Time Provider Jeet Murphy   10/19/2023  2:00 PM Isac Rahman, [facility]               Last refill 10/7/2022. Please approve or deny.
PAST SURGICAL HISTORY:  No significant past surgical history

## 2023-07-04 DIAGNOSIS — M54.9 DORSALGIA, UNSPECIFIED: ICD-10-CM

## 2023-07-04 PROCEDURE — 99233 SBSQ HOSP IP/OBS HIGH 50: CPT

## 2023-07-04 RX ORDER — MORPHINE SULFATE 50 MG/1
4 CAPSULE, EXTENDED RELEASE ORAL ONCE
Refills: 0 | Status: DISCONTINUED | OUTPATIENT
Start: 2023-07-04 | End: 2023-07-04

## 2023-07-04 RX ORDER — DIAZEPAM 5 MG
5 TABLET ORAL ONCE
Refills: 0 | Status: DISCONTINUED | OUTPATIENT
Start: 2023-07-04 | End: 2023-07-04

## 2023-07-04 RX ORDER — OXYCODONE HYDROCHLORIDE 5 MG/1
5 TABLET ORAL ONCE
Refills: 0 | Status: DISCONTINUED | OUTPATIENT
Start: 2023-07-04 | End: 2023-07-04

## 2023-07-04 RX ADMIN — Medication 15 MILLIGRAM(S): at 12:59

## 2023-07-04 RX ADMIN — LIDOCAINE 1 PATCH: 4 CREAM TOPICAL at 22:33

## 2023-07-04 RX ADMIN — MORPHINE SULFATE 4 MILLIGRAM(S): 50 CAPSULE, EXTENDED RELEASE ORAL at 13:45

## 2023-07-04 RX ADMIN — LIDOCAINE 1 PATCH: 4 CREAM TOPICAL at 06:55

## 2023-07-04 RX ADMIN — Medication 15 MILLIGRAM(S): at 18:57

## 2023-07-04 RX ADMIN — Medication 15 MILLIGRAM(S): at 07:00

## 2023-07-04 RX ADMIN — MORPHINE SULFATE 4 MILLIGRAM(S): 50 CAPSULE, EXTENDED RELEASE ORAL at 13:30

## 2023-07-04 RX ADMIN — OXYCODONE HYDROCHLORIDE 5 MILLIGRAM(S): 5 TABLET ORAL at 11:14

## 2023-07-04 RX ADMIN — Medication 5 MILLIGRAM(S): at 02:02

## 2023-07-04 RX ADMIN — Medication 15 MILLIGRAM(S): at 18:14

## 2023-07-04 RX ADMIN — Medication 15 MILLIGRAM(S): at 13:16

## 2023-07-04 RX ADMIN — OXYCODONE HYDROCHLORIDE 5 MILLIGRAM(S): 5 TABLET ORAL at 12:15

## 2023-07-04 RX ADMIN — Medication 15 MILLIGRAM(S): at 23:52
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
## 2023-07-04 NOTE — ED CDU PROVIDER SUBSEQUENT DAY NOTE - CONSTITUTIONAL, MLM
normal... Well appearing, awake, alert, oriented to person, place, time/situation and in no apparent distress. Positive urine culture COPD, moderate Positive urine culture HTN (hypertension) BPH without urinary obstruction COPD, moderate Positive urine culture Positive urine culture BPH without urinary obstruction BPH without urinary obstruction Positive urine culture ASHD (arteriosclerotic heart disease) BPH without urinary obstruction HTN (hypertension) Positive urine culture

## 2023-07-04 NOTE — ED ADULT NURSE REASSESSMENT NOTE - NS ED NURSE REASSESS COMMENT FT1
Break RN: pt a&ox4, resting comfortably in stretcher. Pt denies cp, sob, n/v, headache, dizziness, fever/chills, back pain. Breathing even, unlabored. Report given to JOSE RAUL Goldstein. Pending transport.

## 2023-07-04 NOTE — ED CDU PROVIDER DISPOSITION NOTE - ATTENDING APP SHARED VISIT CONTRIBUTION OF CARE
Seen and examined, have discussed plan of care with PA.  I agree with note as stated above, having amended the EMR as needed to reflect the findings.
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
## 2023-07-04 NOTE — ED CDU PROVIDER SUBSEQUENT DAY NOTE - ATTENDING APP SHARED VISIT CONTRIBUTION OF CARE
Seen and examined, have discussed plan of care with PA.  I agree with note as stated above, having amended the EMR as needed to reflect the findings.  Pt has equal and strenght with plantarflexion of the feet, equal sensation in both LE. ROM is limited more in LLE due to pain.     Plan to admit the patient for pain control and rehab

## 2023-07-04 NOTE — PHYSICAL THERAPY INITIAL EVALUATION ADULT - LEVEL OF INDEPENDENCE: SUPINE/SIT, REHAB EVAL
Patient able to sit up approx 50% of the way but limited due to 10/10 pain in low back, JOSE RAUL Kyle made aware.

## 2023-07-04 NOTE — PHYSICAL THERAPY INITIAL EVALUATION ADULT - REFERRING PHYSICIAN, REHAB EVAL
Encounter Date: 2/14/2017    SCRIBE #1 NOTE: I, Dave Hummel, am scribing for, and in the presence of,  Dr. Disla. I have scribed the entire note.       History     Chief Complaint   Patient presents with    ear bleeding     pt to er with c/o left ear bleeding.      Review of patient's allergies indicates:  No Known Allergies  HPI Comments: Time seen by provider: 10:32 AM    This is a 12 m.o. female who presents with complaint of ear bleeding. She had tubes placed in her ears 5 months ago and has had intermittent discharge from her ears since then. Over the past 1 week the discharge has been more consistent and her mother noticed blood this morning from the left side. She has been pulling at her ears and sticking her fingers in her ears. She has had a mild cough as well. The patient has been afebrile and has not been vomiting.     The history is provided by the mother.     Past Medical History   Diagnosis Date    Jaundice      Mom states slightly     No past medical history pertinent negatives.  Past Surgical History   Procedure Laterality Date    Tympanostomy tube placement Bilateral 2016     Dr. Mayen     Family History   Problem Relation Age of Onset    Liver disease Mother      Copied from mother's history at birth    Cancer Maternal Aunt 30     breast     Social History   Substance Use Topics    Smoking status: Never Smoker    Smokeless tobacco: None    Alcohol use None     Review of Systems   Constitutional: Negative for fever.   HENT: Positive for ear pain (with bloody drainage). Negative for nosebleeds.    Eyes: Negative for redness.   Respiratory: Positive for cough.    Cardiovascular: Negative for palpitations.   Gastrointestinal: Negative for vomiting.   Genitourinary: Negative for hematuria.   Musculoskeletal: Negative for joint swelling.   Skin: Negative for rash.   Neurological: Negative for seizures.   Hematological: Does not bruise/bleed easily.   Psychiatric/Behavioral:  Negative for behavioral problems and confusion.       Physical Exam   Initial Vitals   BP Pulse Resp Temp SpO2   -- 02/14/17 0932 02/14/17 0932 02/14/17 0932 02/14/17 0932    117 20 97.9 °F (36.6 °C) 98 %     Physical Exam    Nursing note and vitals reviewed.  Constitutional: She appears well-developed and well-nourished. She is not diaphoretic. She is active. No distress.   Non-toxic appearing. Making eye contact.    HENT:   Head: Atraumatic. No signs of injury.   Mouth/Throat: Mucous membranes are moist. No tonsillar exudate. Oropharynx is clear.   Right TM with tympanostomy tube in place. No drainage. Left ear with serous exudate at the ear canal. Dried blood to left tragus. Unable to visualize left TM given large amount of fluid.    Eyes: Conjunctivae and EOM are normal. Pupils are equal, round, and reactive to light. Right eye exhibits no discharge. Left eye exhibits no discharge.   Producing tears.    Neck: Normal range of motion. Neck supple. No rigidity or adenopathy.   Cardiovascular: Normal rate, S1 normal and S2 normal. Pulses are strong.    No murmur heard.  Pulmonary/Chest: Effort normal and breath sounds normal. No respiratory distress. She has no wheezes. She has no rhonchi. She has no rales.   Abdominal: Soft. Bowel sounds are normal. She exhibits no distension. There is no tenderness. There is no rebound and no guarding.   Musculoskeletal: Normal range of motion. She exhibits no edema, tenderness, deformity or signs of injury.   Neurological: She is alert. She has normal strength. She exhibits normal muscle tone. She sits.   Appropriate for age   Skin: Skin is warm and dry. No rash noted. No cyanosis. No pallor.         ED Course   Procedures  Labs Reviewed - No data to display          Medical Decision Making:   Initial Assessment:   Urgent evaluation of 12 month female with history of recurrent otitis media here with mom given concern for bleeding from the left ear.  Patient has bilateral  myringotomy tubes placed 10/17/16.  Patient has not had an ear infection placement of myringotomy tubes.  Mom denies fevers, no increased irritability, has been tolerating by mouth with normal amt wet diapers, but the patient has been pulling on left ear, and presents to the ER given dried blood on her pillow this am.  On exam patient is nontoxic-appearing, making eye contact, producing tears on exam, no cervical lymphadenopathy, no evidence of auricular erythema, does have dry blood to the tragus with serous exudate present in canal, no canal erythema or edema, large amount of fluid present.  Given the unilateral symptoms, will treat as acute otitis with antibiotics.  Mom endorses that she will call her ENT specialist today and follow with her pediatrician as soon as possible, and understands strict return precautions regarding fever, vomiting, change in behavior, or any other medical concerns.                Scribe Attestation:   Scribe #1: I performed the above scribed service and the documentation accurately describes the services I performed. I attest to the accuracy of the note.    Attending Attestation:           Physician Attestation for Scribe:  Physician Attestation Statement for Scribe #1: I, Dr. Disla, reviewed documentation, as scribed by Dave Hummel in my presence, and it is both accurate and complete.                 ED Course     Clinical Impression:     1. Chronic middle ear effusion, left       Disposition:   Disposition: Discharged  Condition: Stable       Jahaira Disla MD  02/14/17 1140     Truman Alarcon

## 2023-07-04 NOTE — PATIENT PROFILE ADULT - FALL HARM RISK - HARM RISK INTERVENTIONS
Assistance with ambulation/Assistance OOB with selected safe patient handling equipment/Communicate Risk of Fall with Harm to all staff/Discuss with provider need for PT consult/Monitor gait and stability/Reinforce activity limits and safety measures with patient and family/Review medications for side effects contributing to fall risk/Sit up slowly, dangle for a short time, stand at bedside before walking/Tailored Fall Risk Interventions/Toileting schedule using arm’s reach rule for commode and bathroom/Use of alarms - bed, chair and/or voice tab/Visual Cue: Yellow wristband and red socks/Bed in lowest position, wheels locked, appropriate side rails in place/Call bell, personal items and telephone in reach/Instruct patient to call for assistance before getting out of bed or chair/Non-slip footwear when patient is out of bed/[place] to call system/Physically safe environment - no spills, clutter or unnecessary equipment/Purposeful Proactive Rounding/Room/bathroom lighting operational, light cord in reach

## 2023-07-05 DIAGNOSIS — S32.009A UNSPECIFIED FRACTURE OF UNSPECIFIED LUMBAR VERTEBRA, INITIAL ENCOUNTER FOR CLOSED FRACTURE: ICD-10-CM

## 2023-07-05 DIAGNOSIS — Z29.9 ENCOUNTER FOR PROPHYLACTIC MEASURES, UNSPECIFIED: ICD-10-CM

## 2023-07-05 DIAGNOSIS — M54.16 RADICULOPATHY, LUMBAR REGION: ICD-10-CM

## 2023-07-05 LAB
ANION GAP SERPL CALC-SCNC: 14 MMOL/L — SIGNIFICANT CHANGE UP (ref 7–14)
BUN SERPL-MCNC: 17 MG/DL — SIGNIFICANT CHANGE UP (ref 7–23)
CALCIUM SERPL-MCNC: 9.1 MG/DL — SIGNIFICANT CHANGE UP (ref 8.4–10.5)
CHLORIDE SERPL-SCNC: 101 MMOL/L — SIGNIFICANT CHANGE UP (ref 98–107)
CO2 SERPL-SCNC: 27 MMOL/L — SIGNIFICANT CHANGE UP (ref 22–31)
CREAT SERPL-MCNC: 0.98 MG/DL — SIGNIFICANT CHANGE UP (ref 0.5–1.3)
EGFR: 109 ML/MIN/1.73M2 — SIGNIFICANT CHANGE UP
GLUCOSE SERPL-MCNC: 93 MG/DL — SIGNIFICANT CHANGE UP (ref 70–99)
HCT VFR BLD CALC: 46.1 % — SIGNIFICANT CHANGE UP (ref 39–50)
HGB BLD-MCNC: 15 G/DL — SIGNIFICANT CHANGE UP (ref 13–17)
MAGNESIUM SERPL-MCNC: 2.3 MG/DL — SIGNIFICANT CHANGE UP (ref 1.6–2.6)
MCHC RBC-ENTMCNC: 28 PG — SIGNIFICANT CHANGE UP (ref 27–34)
MCHC RBC-ENTMCNC: 32.5 GM/DL — SIGNIFICANT CHANGE UP (ref 32–36)
MCV RBC AUTO: 86.2 FL — SIGNIFICANT CHANGE UP (ref 80–100)
NRBC # BLD: 0 /100 WBCS — SIGNIFICANT CHANGE UP (ref 0–0)
NRBC # FLD: 0 K/UL — SIGNIFICANT CHANGE UP (ref 0–0)
PHOSPHATE SERPL-MCNC: 4.3 MG/DL — SIGNIFICANT CHANGE UP (ref 2.5–4.5)
PLATELET # BLD AUTO: 224 K/UL — SIGNIFICANT CHANGE UP (ref 150–400)
POTASSIUM SERPL-MCNC: 4 MMOL/L — SIGNIFICANT CHANGE UP (ref 3.5–5.3)
POTASSIUM SERPL-SCNC: 4 MMOL/L — SIGNIFICANT CHANGE UP (ref 3.5–5.3)
RBC # BLD: 5.35 M/UL — SIGNIFICANT CHANGE UP (ref 4.2–5.8)
RBC # FLD: 13 % — SIGNIFICANT CHANGE UP (ref 10.3–14.5)
SODIUM SERPL-SCNC: 142 MMOL/L — SIGNIFICANT CHANGE UP (ref 135–145)
WBC # BLD: 5.74 K/UL — SIGNIFICANT CHANGE UP (ref 3.8–10.5)
WBC # FLD AUTO: 5.74 K/UL — SIGNIFICANT CHANGE UP (ref 3.8–10.5)

## 2023-07-05 PROCEDURE — 99223 1ST HOSP IP/OBS HIGH 75: CPT

## 2023-07-05 PROCEDURE — 12345: CPT | Mod: NC

## 2023-07-05 PROCEDURE — 99232 SBSQ HOSP IP/OBS MODERATE 35: CPT

## 2023-07-05 RX ORDER — POLYETHYLENE GLYCOL 3350 17 G/17G
17 POWDER, FOR SOLUTION ORAL DAILY
Refills: 0 | Status: DISCONTINUED | OUTPATIENT
Start: 2023-07-05 | End: 2023-07-05

## 2023-07-05 RX ORDER — ACETAMINOPHEN 500 MG
650 TABLET ORAL EVERY 6 HOURS
Refills: 0 | Status: DISCONTINUED | OUTPATIENT
Start: 2023-07-05 | End: 2023-07-06

## 2023-07-05 RX ORDER — POLYETHYLENE GLYCOL 3350 17 G/17G
17 POWDER, FOR SOLUTION ORAL
Refills: 0 | Status: DISCONTINUED | OUTPATIENT
Start: 2023-07-05 | End: 2023-07-06

## 2023-07-05 RX ORDER — GABAPENTIN 400 MG/1
100 CAPSULE ORAL
Refills: 0 | Status: DISCONTINUED | OUTPATIENT
Start: 2023-07-05 | End: 2023-07-05

## 2023-07-05 RX ORDER — CHLORHEXIDINE GLUCONATE 213 G/1000ML
1 SOLUTION TOPICAL DAILY
Refills: 0 | Status: DISCONTINUED | OUTPATIENT
Start: 2023-07-05 | End: 2023-07-06

## 2023-07-05 RX ORDER — ACETAMINOPHEN 500 MG
2 TABLET ORAL
Refills: 0 | DISCHARGE

## 2023-07-05 RX ORDER — OXYCODONE HYDROCHLORIDE 5 MG/1
7.5 TABLET ORAL EVERY 6 HOURS
Refills: 0 | Status: DISCONTINUED | OUTPATIENT
Start: 2023-07-05 | End: 2023-07-06

## 2023-07-05 RX ORDER — GABAPENTIN 400 MG/1
200 CAPSULE ORAL
Refills: 0 | Status: DISCONTINUED | OUTPATIENT
Start: 2023-07-05 | End: 2023-07-06

## 2023-07-05 RX ORDER — SENNA PLUS 8.6 MG/1
2 TABLET ORAL AT BEDTIME
Refills: 0 | Status: DISCONTINUED | OUTPATIENT
Start: 2023-07-05 | End: 2023-07-06

## 2023-07-05 RX ADMIN — OXYCODONE HYDROCHLORIDE 7.5 MILLIGRAM(S): 5 TABLET ORAL at 20:33

## 2023-07-05 RX ADMIN — LIDOCAINE 1 PATCH: 4 CREAM TOPICAL at 07:00

## 2023-07-05 RX ADMIN — OXYCODONE HYDROCHLORIDE 7.5 MILLIGRAM(S): 5 TABLET ORAL at 19:48

## 2023-07-05 RX ADMIN — SENNA PLUS 2 TABLET(S): 8.6 TABLET ORAL at 22:20

## 2023-07-05 RX ADMIN — Medication 15 MILLIGRAM(S): at 09:30

## 2023-07-05 RX ADMIN — Medication 24 MILLIGRAM(S): at 19:47

## 2023-07-05 RX ADMIN — LIDOCAINE 1 PATCH: 4 CREAM TOPICAL at 10:00

## 2023-07-05 RX ADMIN — GABAPENTIN 100 MILLIGRAM(S): 400 CAPSULE ORAL at 09:30

## 2023-07-05 RX ADMIN — GABAPENTIN 200 MILLIGRAM(S): 400 CAPSULE ORAL at 17:30

## 2023-07-05 RX ADMIN — LIDOCAINE 1 PATCH: 4 CREAM TOPICAL at 13:18

## 2023-07-05 RX ADMIN — OXYCODONE HYDROCHLORIDE 7.5 MILLIGRAM(S): 5 TABLET ORAL at 14:24

## 2023-07-05 RX ADMIN — Medication 650 MILLIGRAM(S): at 04:02

## 2023-07-05 RX ADMIN — Medication 650 MILLIGRAM(S): at 05:02

## 2023-07-05 RX ADMIN — Medication 15 MILLIGRAM(S): at 00:25

## 2023-07-05 RX ADMIN — Medication 15 MILLIGRAM(S): at 09:45

## 2023-07-05 RX ADMIN — OXYCODONE HYDROCHLORIDE 7.5 MILLIGRAM(S): 5 TABLET ORAL at 13:24

## 2023-07-05 RX ADMIN — LIDOCAINE 1 PATCH: 4 CREAM TOPICAL at 19:58
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
## 2023-07-05 NOTE — PROGRESS NOTE ADULT - PROBLEM SELECTOR PLAN 1
c/w toradol, acetaminophen PRN, lidocaine patch  patient reports minimal relief with oxycodone, major complaint is radicular pain, will add on gabapentin and assess for relief  fall precautions  appreciate PT recs  TLSO brace at bedside  given radicular pain, called neurosurgery for full consult--> rec's MRI L wwo c/w toradol, acetaminophen PRN, lidocaine patch  patient reports minimal relief with oxycodone, major complaint is radicular pain, will add on gabapentin and assess for relief  fall precautions  appreciate PT recs  TLSO brace at bedside  given radicular pain, called neurosurgery for full consult--> rec's MRI L wwo [ ]  oxycodone working better than IV toradol, d/c toradol while pt is on steroids. Oxycodone incr to 7.5 mg q 6 hr PRN for pain, gabapentin incr to 200 mg BID
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
## 2023-07-05 NOTE — H&P ADULT - PROBLEM SELECTOR PLAN 1
c/w toradol, acetaminophen PRN, lidocaine patch  patient reports minimal relief with oxycodone, major complaint is radicular pain, will add on gabapentin and assess for relief  fall precautions  appreciate PT recs  TLSO brace at bedside  given radicular pain, called neurosurgery for full consult
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
## 2023-07-05 NOTE — H&P ADULT - NSHPLABSRESULTS_GEN_ALL_CORE
Labs from 7/02 reviewed    < from: CT Head No Cont (07.03.23 @ 04:03) >/< from: CT Cervical Spine No Cont (07.03.23 @ 04:03) >  CT HEAD: There is no evidence of mass effect, midline shift, acute intracranial hemorrhage, or extra-axial collections. The ventricular and sulcal size and configuration is age appropriate. The calvarium is intact. Bilateral maxillary sinus mucous retention cysts versus polyps, larger on the right, and minimal mucosal thickening within bilateral ethmoid air cells The mastoid air cells are predominantly clear. The orbits are unremarkable.  CT CERVICAL SPINE: There is straightening of usual cervical lordosis. There is no significant subluxation. Vertebral body height is preserved throughout the cervical spine. There is no significant loss of intervertebral disc height throughout the cervical spine. The paravertebral soft tissues are unremarkable. The lung apices are clear.   IMPRESSION:  CT Head: No acute intracranial hemorrhage, mass effect, or acute osseous fracture.  CT Cervical Spine: No acute fracture or traumatic subluxation.  < end of copied text >    < from: CT Chest w/ IV Cont (07.03.23 @ 04:04) >/< from: CT Abdomen and Pelvis w/ IV Cont (07.03.23 @ 04:03) >/< from: CT Thoracic Spine No Cont (07.03.23 @ 04:04) >/< from: CT Lumbar Spine No Cont (07.03.23 @ 04:04) >  CHEST:  LUNGS AND LARGE AIRWAYS: Clear lungs.  PLEURA: No pleural effusion or pneumothorax.  VESSELS: No traumatic aortic injury. Pulmonary arteries are opacified to the segmental level. No acute thromboembolus. Main pulmonary artery is normal in caliber.  HEART: Heart size is normal. No pericardial effusion.  MEDIASTINUM AND CANDY: No mediastinal hemorrhage or pneumomediastinum.  CHEST WALL AND LOWER NECK: No hematoma or soft tissue emphysema.  ABDOMEN AND PELVIS:   LIVER: Within normal limits.  BILE DUCTS: Normal caliber.  GALLBLADDER: Within normal limits.  SPLEEN: Within normal limits.  PANCREAS: Within normal limits.  ADRENALS: Within normal limits.  KIDNEYS/URETERS: Within normal limits.  BLADDER: Within normal limits.  REPRODUCTIVE ORGANS: Prostate is normal in size.  BOWEL: No bowel obstruction. Appendix measures 10 mm in thickness, without inflammatory change.  PERITONEUM: No free air, free fluid or mesenteric contusion.  VESSELS: Normal in caliber and patent.  RETROPERITONEUM/LYMPH NODES: No retroperitoneal hematoma.. No suspicious adenopathy.  ABDOMINAL WALL: Tiny fat-containing periumbilical hernia. No hematoma  BONES: Acute fractures of the right L3 and L4 transverse processes.  Thoracic and lumbar spine: 12 rib-bearing thoracic vertebra. 5 lumbar type vertebra. Vertebral body heights and alignment are normal. No prevertebral edema. Articular facet joints are normally aligned. Acute fractures of the right L3 and L4 transverse processes. No evidence of degenerative change. No central or foraminal stenosis. Evaluation of the canal contents is unremarkable.  IMPRESSION:   1. Acute fractures the right L3 and L4 transverse processes.  2. The remainder of the chest, abdomen and pelvis and thoracolumbar spine is negative for acute trauma.  < end of copied text >

## 2023-07-05 NOTE — CONSULT NOTE ADULT - PROBLEM SELECTOR RECOMMENDATION 9
- No surgical intervention of right L3 and L4 TP fracture  - Given radiculopathy and hypereflexia on exam would get MRI thoracic and lumbar spine without contrast for further evaluation   - TLSO brace at bedside FOR COMFORT - No surgical intervention of right L3 and L4 TP fracture  - Given radiculopathy and hypereflexia on exam would get MRI thoracic and lumbar spine without contrast for further evaluation   - TLSO brace at bedside FOR COMFORT  - WBAT  - consider Increasing Gabapentin - No surgical intervention of right L3 and L4 TP fracture  - Given radiculopathy and hypereflexia on exam would get MRI lumbar spine without contrast for further evaluation   - TLSO brace at bedside FOR COMFORT  - WBAT  - consider Increasing Gabapentin - No surgical intervention of right L3 and L4 TP fracture  - Given radiculopathy and hypereflexia on exam would get MRI thoracic and lumbar spine without contrast for further evaluation   - TLSO brace at bedside FOR COMFORT  - WBAT  - consider Increasing Gabapentin, currently on 100 TID  - Start Medrol dose pack
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
## 2023-07-05 NOTE — H&P ADULT - NSHPPHYSICALEXAM_GEN_ALL_CORE
Vital Signs Last 24 Hrs  T(C): 36.7 (04 Jul 2023 19:58), Max: 37.2 (04 Jul 2023 13:25)  T(F): 98.1 (04 Jul 2023 19:58), Max: 99 (04 Jul 2023 13:25)  HR: 72 (04 Jul 2023 19:58) (68 - 78)  BP: 112/65 (04 Jul 2023 19:58) (112/65 - 125/75)  RR: 18 (04 Jul 2023 19:58) (18 - 18)  SpO2: 99% (04 Jul 2023 19:58) (97% - 100%)    Parameters below as of 04 Jul 2023 19:58  Patient On (Oxygen Delivery Method): room air    PHYSICAL EXAM:  GENERAL: NAD, well-developed, well-nourished  HEAD:  Atraumatic, Normocephalic  EYES: EOMI, PERRL, conjunctiva and sclera clear  NECK: Supple, No JVD  CHEST/LUNG: Clear to auscultation bilaterally; No wheezes, rales or rhonchi; normal work of breathing, speaking in full sentences  HEART: Regular rate and rhythm; No murmurs, rubs, or gallops, (+)S1, S2  ABDOMEN: Soft, Nontender, Nondistended; Normal Bowel sounds   EXTREMITIES:  2+ Peripheral Pulses, No clubbing, cyanosis, or edema  PSYCH: normal mood and affect, A&Ox3  NEUROLOGY: no focal neuro deficits  SKIN: No rashes or lesions

## 2023-07-06 ENCOUNTER — TRANSCRIPTION ENCOUNTER (OUTPATIENT)
Age: 28
End: 2023-07-06

## 2023-07-06 VITALS
TEMPERATURE: 98 F | DIASTOLIC BLOOD PRESSURE: 75 MMHG | SYSTOLIC BLOOD PRESSURE: 112 MMHG | HEART RATE: 71 BPM | OXYGEN SATURATION: 100 % | RESPIRATION RATE: 15 BRPM

## 2023-07-06 LAB
MRSA PCR RESULT.: SIGNIFICANT CHANGE UP
S AUREUS DNA NOSE QL NAA+PROBE: SIGNIFICANT CHANGE UP

## 2023-07-06 PROCEDURE — 72146 MRI CHEST SPINE W/O DYE: CPT | Mod: 26

## 2023-07-06 PROCEDURE — 72148 MRI LUMBAR SPINE W/O DYE: CPT | Mod: 26

## 2023-07-06 PROCEDURE — 99239 HOSP IP/OBS DSCHRG MGMT >30: CPT

## 2023-07-06 RX ORDER — IBUPROFEN 200 MG
1 TABLET ORAL
Refills: 0 | DISCHARGE

## 2023-07-06 RX ORDER — LIDOCAINE 4 G/100G
1 CREAM TOPICAL
Qty: 0 | Refills: 0 | DISCHARGE
Start: 2023-07-06

## 2023-07-06 RX ORDER — SENNA PLUS 8.6 MG/1
2 TABLET ORAL
Qty: 14 | Refills: 0
Start: 2023-07-06 | End: 2023-07-12

## 2023-07-06 RX ORDER — LACTOBACILLUS ACIDOPHILUS 100MM CELL
1 CAPSULE ORAL
Qty: 30 | Refills: 0
Start: 2023-07-06 | End: 2023-08-04

## 2023-07-06 RX ORDER — GABAPENTIN 400 MG/1
2 CAPSULE ORAL
Qty: 120 | Refills: 0
Start: 2023-07-06 | End: 2023-08-04

## 2023-07-06 RX ORDER — POLYETHYLENE GLYCOL 3350 17 G/17G
17 POWDER, FOR SOLUTION ORAL
Qty: 1 | Refills: 0
Start: 2023-07-06 | End: 2023-07-12

## 2023-07-06 RX ORDER — OXYCODONE HYDROCHLORIDE 5 MG/1
1 TABLET ORAL
Qty: 28 | Refills: 0
Start: 2023-07-06 | End: 2023-07-12

## 2023-07-06 RX ORDER — LACTOBACILLUS ACIDOPHILUS 100MM CELL
1 CAPSULE ORAL DAILY
Refills: 0 | Status: DISCONTINUED | OUTPATIENT
Start: 2023-07-06 | End: 2023-07-06

## 2023-07-06 RX ORDER — OXYCODONE HYDROCHLORIDE 5 MG/1
1.5 TABLET ORAL
Qty: 42 | Refills: 0
Start: 2023-07-06 | End: 2023-07-12

## 2023-07-06 RX ADMIN — Medication 4 MILLIGRAM(S): at 06:11

## 2023-07-06 RX ADMIN — LIDOCAINE 1 PATCH: 4 CREAM TOPICAL at 01:17

## 2023-07-06 RX ADMIN — GABAPENTIN 200 MILLIGRAM(S): 400 CAPSULE ORAL at 12:49

## 2023-07-06 RX ADMIN — OXYCODONE HYDROCHLORIDE 7.5 MILLIGRAM(S): 5 TABLET ORAL at 05:49

## 2023-07-06 RX ADMIN — Medication 1 TABLET(S): at 12:45

## 2023-07-06 RX ADMIN — Medication 4 MILLIGRAM(S): at 12:48

## 2023-07-06 RX ADMIN — OXYCODONE HYDROCHLORIDE 7.5 MILLIGRAM(S): 5 TABLET ORAL at 12:56

## 2023-07-06 RX ADMIN — LIDOCAINE 1 PATCH: 4 CREAM TOPICAL at 12:45

## 2023-07-06 RX ADMIN — OXYCODONE HYDROCHLORIDE 7.5 MILLIGRAM(S): 5 TABLET ORAL at 05:11

## 2023-07-06 NOTE — DISCHARGE NOTE PROVIDER - NSRESEARCHGRANT_HIDDEN_GEN_A_CORE
Refill: 0      Return in about 1 month (around 2/10/2020) for mood. An electronic signature was used to authenticate this note.     --Maddi White MD on 1/12/2020 at 4:54 PM
Yes

## 2023-07-06 NOTE — DISCHARGE NOTE PROVIDER - ATTENDING DISCHARGE PHYSICAL EXAMINATION:
GENERAL: NAD  HEAD:  Atraumatic, Normocephalic  EYES: EOMI, PERRL, conjunctiva and sclera clear  NECK: Supple, No JVD  CHEST/LUNG: Clear to auscultation bilaterally; No wheezes, rales or rhonchi; normal work of breathing, speaking in full sentences  HEART: Regular rate and rhythm; No murmurs, rubs, or gallops, (+)S1, S2  ABDOMEN: Soft, Nontender, Nondistended; Normal Bowel sounds   EXTREMITIES:  2+ Peripheral Pulses, No clubbing, cyanosis, or edema  PSYCH: normal mood and affect, A&Ox3  NEUROLOGY: pt ambulating with brace on
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
## 2023-07-06 NOTE — DISCHARGE NOTE PROVIDER - CARE PROVIDER_API CALL
Daren Vasquez)  Orthopaedic Surgery  [address], [address]  [place] [address]  Phone: [phone]  Fax: [phone]  Follow Up Time:

## 2023-07-06 NOTE — DISCHARGE NOTE NURSING/CASE MANAGEMENT/SOCIAL WORK - PATIENT PORTAL LINK FT
You can access the FollowMyHealth Patient Portal offered by [facility] by registering at the following website: [email or web address]. By joining [facility]’s FollowMyHealth portal, you will also be able to view your health information using other applications (apps) compatible with our system.
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
## 2023-07-06 NOTE — DISCHARGE NOTE NURSING/CASE MANAGEMENT/SOCIAL WORK - NSDCPEFALRISK_GEN_ALL_CORE
For information on Fall & Injury Prevention, visit: [email or web address] OR  [email or web address] OR  https://www.cdc.gov/steadi/patient.html

## 2023-07-06 NOTE — DISCHARGE NOTE PROVIDER - NSDCMRMEDTOKEN_GEN_ALL_CORE_FT
ibuprofen 400 mg oral tablet: 1 tab(s) orally 2 times a day  Tylenol 500 mg oral tablet: 2 tab(s) orally once a day   gabapentin 100 mg oral capsule: 2 cap(s) orally 2 times a day  ibuprofen 400 mg oral tablet: 1 tab(s) orally 2 times a day  lidocaine 4% topical film: Apply topically to affected area once a day  MethylPREDNISolone Dose Pack 4 mg oral tablet: 1 tab(s) orally 4 times a day patient already had 1st dose of 24mg and 4mg  oxyCODONE 7.5 mg oral tablet: 1 tab(s) orally every 6 hours MDD: 4 tabs  polyethylene glycol 3350 oral powder for reconstitution: 17 gram(s) orally 2 times a day  senna leaf extract oral tablet: 2 tab(s) orally once a day (at bedtime)  Tylenol 500 mg oral tablet: 2 tab(s) orally once a day   gabapentin 100 mg oral capsule: 2 cap(s) orally 2 times a day  lactobacillus acidophilus oral capsule: 1 tab(s) orally once a day  lidocaine 4% topical film: Apply topically to affected area once a day  MethylPREDNISolone Dose Pack 4 mg oral tablet: 1 tab(s) orally 4 times a day patient already had 1st dose of 24mg and 4mg  oxyCODONE 7.5 mg oral tablet: 1 tab(s) orally every 6 hours MDD: 4 tabs  physical therapy 3 times a week: back brace during therapy. Right lower leg nerve pain  polyethylene glycol 3350 oral powder for reconstitution: 17 gram(s) orally 2 times a day  senna leaf extract oral tablet: 2 tab(s) orally once a day (at bedtime)  Tylenol 500 mg oral tablet: 2 tab(s) orally once a day   gabapentin 100 mg oral capsule: 2 cap(s) orally 2 times a day  lactobacillus acidophilus oral capsule: 1 tab(s) orally once a day  lidocaine 4% topical film: Apply topically to affected area once a day  MethylPREDNISolone Dose Pack 4 mg oral tablet: 1 tab(s) orally 4 times a day patient already had 1st dose of 24mg and 4mg  oxyCODONE 5 mg oral tablet: 1.5 tab(s) orally every 6 hours as needed for severe agitation MDD: 6 tab  polyethylene glycol 3350 oral powder for reconstitution: 17 gram(s) orally 2 times a day  senna leaf extract oral tablet: 2 tab(s) orally once a day (at bedtime)  Tylenol 500 mg oral tablet: 2 tab(s) orally once a day

## 2023-07-06 NOTE — PHARMACOTHERAPY INTERVENTION NOTE - COMMENTS
Discharge medications were reviewed with the patient and his mother at bedside. Current medication schedule was discussed in detail including: medication name, indication, dose, administration times, side effects, and special instructions. All questions and concerns were answered and addressed. Patient and his mother verbalized understanding and were provided with educational handout.    Alexandra Bowser, PharmD  Clinical Pharmacy Specialist  [facility] [id number]

## 2023-07-06 NOTE — DISCHARGE NOTE PROVIDER - NSDCCPTREATMENT_GEN_ALL_CORE_FT
PRINCIPAL PROCEDURE  Procedure: MRI  Findings and Treatment: FINDINGS:  LUMBAR SPINE:  OSSEOUS STRUCTURES AND ALIGNMENT:  Lumbar vertebral alignment is preserved.   Lumbar vertebral body heights   are maintained.  Marrow signal intensity within lumbar vertebra is   significant for mild marrow edema pattern at the right transverse process   of L3. There is minimal associated adjacent soft tissue edema pattern   consistent with associated strain injury within the adjacent paraspinal   musculature. At L4 there is also marrow edema pattern within the   transverse process and is similar pattern of adjacent skeletal muscle   strain injury edema. These fractures are only minimally offset and better   demonstrated by the CT technique. There is no associated fluid   collection.   No osseous expansion or epidural disease is identified.  SACRUM/SACROILIAC JOINTS/VISUALIZED PELVIC BONES:  The sacrum and visualized pelvic bones appear intact.  The sacroiliac   joints are incompletely visualized, as seen intact.  PARASPINAL SOFT TISSUES: See above.  VISUALIZED ABDOMINAL AND PELVIC SOFT TISSUES: Seen recent body CT report.  LUMBAR INTERVERTEBRAL DISC LEVELS:  General comments:   Lumbar intervertebral discs preserved heightand   signal intensity.  T12-L1:   No central canal or foraminal stenosis.  L1-L2:    No central canal or foraminal stenosis.  L2-L3:    No central canal or foraminal stenosis.  L3-L4:    No central canal or foraminal stenosis.  L4-L5:    No central canal or foraminal stenosis.  L5-S1:   No central canal or foraminal stenosis.  CNS STRUCTURES:  The distal cord maintains intact morphology and signal intensity.  The   conus is  normally positioned at L1.   Nerve roots of the cauda equina   appear intact.  OTHER:  IMPRESSION:  1. THORACIC SPINE:   No thoracic vertebral fracture. Minimal thoracic   degenerative disc disease  2. LUMBAR SPINE:   L3 and L4 right transverse process fractures.   Mild   associated skeletal muscle edema pattern.  No other abnormality.

## 2023-07-24 ENCOUNTER — APPOINTMENT (OUTPATIENT)
Age: 28
End: 2023-07-24
Payer: COMMERCIAL

## 2023-07-24 VITALS
HEART RATE: 91 BPM | DIASTOLIC BLOOD PRESSURE: 80 MMHG | BODY MASS INDEX: 27.47 KG/M2 | WEIGHT: 175 LBS | SYSTOLIC BLOOD PRESSURE: 124 MMHG | HEIGHT: 67 IN | TEMPERATURE: 97.8 F | OXYGEN SATURATION: 98 %

## 2023-07-24 DIAGNOSIS — Z78.9 OTHER SPECIFIED HEALTH STATUS: ICD-10-CM

## 2023-07-24 PROCEDURE — 99203 OFFICE O/P NEW LOW 30 MIN: CPT

## 2023-07-24 NOTE — DISCUSSION/SUMMARY
[de-identified] : We discussed further treatment options.  Overall, he is improving with conservative measures.  He will continue with this.  Restrictions were reviewed.  He will let me know if any changes or worsening of his symptoms.
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
## 2023-07-24 NOTE — PHYSICAL EXAM
[de-identified] : Examination of the lumbar spine reveals no midline tenderness palpation, step-offs, or skin lesions. Decreased range of motion with respect to flexion, extension, lateral bending, and rotation. No tenderness to palpation of the sciatic notch. No tenderness palpation of the bilateral greater trochanters. No pain with passive internal/external rotation of the hips. No instability of bilateral lower extremities.  Negative BIBI. Negative straight leg raise bilaterally. No bowstring. Negative femoral stretch. 5 out of 5 iliopsoas, hip abductors, hips adductors, quadriceps, hamstrings, gastrocsoleus, tibialis anterior, extensor hallucis longus, peroneals. Grossly intact sensation to light touch bilateral lower extremities. 1+ patellar and Achilles reflexes. Downgoing Babinski. No clonus. Intact proprioception. Palpable pulses. No skin lesion and no edema on the right and left lower extremities. [de-identified] : Review of his CT scan from the hospital reveals L3 and L4 right-sided transverse process fractures.

## 2023-08-03 PROBLEM — S32.009A CLOSED FRACTURE OF TRANSVERSE PROCESS OF LUMBAR VERTEBRA, INITIAL ENCOUNTER: Status: ACTIVE | Noted: 2023-07-24
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
## 2023-08-07 ENCOUNTER — APPOINTMENT (OUTPATIENT)
Dept: SPINE | Facility: CLINIC | Age: 28
End: 2023-08-07

## 2023-08-07 DIAGNOSIS — S32.009A UNSPECIFIED FRACTURE OF UNSPECIFIED LUMBAR VERTEBRA, INITIAL ENCOUNTER FOR CLOSED FRACTURE: ICD-10-CM

## 2024-01-08 NOTE — PHYSICAL THERAPY INITIAL EVALUATION ADULT - RANGE OF MOTION EXAMINATION, REHAB EVAL
bilateral upper extremity ROM was WFL (within functional limits)/bilateral lower extremity ROM was WFL (within functional limits) Render In Strict Bullet Format?: No Continue Regimen: doxycycline hyclate 100 mg capsule \\nTake 1 po qd WITH FOOD Detail Level: Zone Initiate Treatment: hydroxyzine HCl 10 mg tablet \\nTake 1 po bid Plan: Pt wants to use a topical powder spray, explained do not encourage Continue Regimen: plain vaseline\\n\\nketoconazole 2 % topical cream BID\\nApply bid prn rash Initiate Treatment: ketoconazole 2 % topical cream BID\\nApply bid prn rash